# Patient Record
(demographics unavailable — no encounter records)

---

## 2025-02-02 NOTE — HISTORY OF PRESENT ILLNESS
[No Pertinent Cardiac History] : no history of aortic stenosis, atrial fibrillation, coronary artery disease, recent myocardial infarction, or implantable device/pacemaker [No Pertinent Pulmonary History] : no history of asthma, COPD, sleep apnea, or smoking [No Adverse Anesthesia Reaction] : no adverse anesthesia reaction in self or family member [Chronic Kidney Disease] : no chronic kidney disease [Diabetes] : diabetes [(Patient denies any chest pain, claudication, dyspnea on exertion, orthopnea, palpitations or syncope)] : Patient denies any chest pain, claudication, dyspnea on exertion, orthopnea, palpitations or syncope [Moderate (4-6 METs)] : Moderate (4-6 METs) [Anti-Platelet Agents: _____] : Anti-Platelet Agents: [unfilled] [FreeTextEntry1] : colonoscopy [FreeTextEntry2] : 2/12/25 [FreeTextEntry3] : Dr. Mendoza Pina [FreeTextEntry4] : 75 yo F with DM, PAD, HLD, HTN presents for pre-op clearance for colonoscopy.  Pt was on ozempic, but has stopped due to intolerance. Pt initially was doing well, lost weight. Ater 1-2 months, started to get vomiting and bitter taste in mouth. Pt prefers to go back on jardiance which was discontinued due to insurance coverage. She tolerated that med well.   Pt on baby aspirin for PAD - stents placed 2 years ago.

## 2025-02-02 NOTE — ASSESSMENT
[Patient Optimized for Surgery] : Patient optimized for surgery [No Further Testing Recommended] : no further testing recommended [As per surgery] : as per surgery [FreeTextEntry4] : 77 yo F is low cardiovascular risk for colonoscopy. Pt to stop baby aspirin 1 week prior to procedure. Pt to start jardiance after procedure. Pt does not tolerate ozempic.

## 2025-07-18 NOTE — HISTORY OF PRESENT ILLNESS
[Coronary Artery Disease] : coronary artery disease [No Pertinent Pulmonary History] : no history of asthma, COPD, sleep apnea, or smoking [Chronic Anticoagulation] : chronic anticoagulation [Diabetes] : diabetes [(Patient denies any chest pain, claudication, dyspnea on exertion, orthopnea, palpitations or syncope)] : Patient denies any chest pain, claudication, dyspnea on exertion, orthopnea, palpitations or syncope [Aortic Stenosis] : no aortic stenosis [Atrial Fibrillation] : no atrial fibrillation [Recent Myocardial Infarction] : no recent myocardial infarction [Implantable Device/Pacemaker] : no implantable device/pacemaker [Chronic Kidney Disease] : no chronic kidney disease [Moderate (4-6 METs)] : Moderate (4-6 METs) [Anti-Platelet Agents: _____] : Anti-Platelet Agents: [unfilled] [FreeTextEntry1] : cholecystectomy [FreeTextEntry2] : 8/6/25 [FreeTextEntry3] : Joaquin Pacheco [FreeTextEntry4] : 76 yo F with hx DM, HTN, HLD, PAD, cholelithiasis presents for pre-op clearance for cholecystectomy.  a1c 7.4-- did not take jardiance due to high copay. Ozempic caused GI side effects. Currently on metformin 1,000mg BID and glipizide 10mg.  [FreeTextEntry7] : Outside cardiologist, last seen May 2025.

## 2025-07-18 NOTE — ASSESSMENT
[Patient Optimized for Surgery] : Patient optimized for surgery [No Further Testing Recommended] : no further testing recommended [As per surgery] : as per surgery [FreeTextEntry4] : Pt is low-moderate cardiovascular risk.  [FreeTextEntry6] : hold baby aspirin 1 week prior [FreeTextEntry7] : hold metformin, losartan, glipizide morning of surgery